# Patient Record
Sex: FEMALE | Race: WHITE | NOT HISPANIC OR LATINO
[De-identification: names, ages, dates, MRNs, and addresses within clinical notes are randomized per-mention and may not be internally consistent; named-entity substitution may affect disease eponyms.]

---

## 2017-02-03 ENCOUNTER — APPOINTMENT (OUTPATIENT)
Dept: FAMILY MEDICINE | Facility: CLINIC | Age: 82
End: 2017-02-03

## 2017-02-03 VITALS
WEIGHT: 90 LBS | SYSTOLIC BLOOD PRESSURE: 159 MMHG | BODY MASS INDEX: 18.14 KG/M2 | HEIGHT: 59 IN | DIASTOLIC BLOOD PRESSURE: 98 MMHG

## 2017-02-14 LAB
ALBUMIN SERPL ELPH-MCNC: 4.2 G/DL
ALP BLD-CCNC: 119 U/L
ALT SERPL-CCNC: 15 U/L
ANION GAP SERPL CALC-SCNC: 18 MMOL/L
AST SERPL-CCNC: 21 U/L
BILIRUB SERPL-MCNC: 0.3 MG/DL
BUN SERPL-MCNC: 28 MG/DL
CALCIUM SERPL-MCNC: 11.2 MG/DL
CHLORIDE SERPL-SCNC: 102 MMOL/L
CO2 SERPL-SCNC: 24 MMOL/L
CREAT SERPL-MCNC: 0.96 MG/DL
GLUCOSE SERPL-MCNC: 76 MG/DL
POTASSIUM SERPL-SCNC: 4.6 MMOL/L
PROT SERPL-MCNC: 7.1 G/DL
SODIUM SERPL-SCNC: 144 MMOL/L

## 2017-05-04 ENCOUNTER — APPOINTMENT (OUTPATIENT)
Dept: FAMILY MEDICINE | Facility: CLINIC | Age: 82
End: 2017-05-04

## 2017-05-04 VITALS
HEIGHT: 59 IN | HEART RATE: 64 BPM | WEIGHT: 90 LBS | DIASTOLIC BLOOD PRESSURE: 84 MMHG | BODY MASS INDEX: 18.14 KG/M2 | SYSTOLIC BLOOD PRESSURE: 133 MMHG

## 2017-05-04 DIAGNOSIS — S72.142D DISPLACED INTERTROCHANTERIC FRACTURE OF LEFT FEMUR, SUBSEQUENT ENCOUNTER FOR CLOSED FRACTURE WITH ROUTINE HEALING: ICD-10-CM

## 2017-05-04 DIAGNOSIS — S00.93XA CONTUSION OF UNSPECIFIED PART OF HEAD, INITIAL ENCOUNTER: ICD-10-CM

## 2017-05-04 DIAGNOSIS — S61.419A LACERATION W/OUT FOREIGN BODY OF UNSPECIFIED HAND, INITIAL ENCOUNTER: ICD-10-CM

## 2017-05-04 DIAGNOSIS — S81.819A LACERATION W/OUT FOREIGN BODY, UNSPECIFIED LOWER LEG, INITIAL ENCOUNTER: ICD-10-CM

## 2017-05-04 DIAGNOSIS — Z20.828 CONTACT WITH AND (SUSPECTED) EXPOSURE TO OTHER VIRAL COMMUNICABLE DISEASES: ICD-10-CM

## 2017-05-04 DIAGNOSIS — L03.90 CELLULITIS, UNSPECIFIED: ICD-10-CM

## 2017-05-04 DIAGNOSIS — Z87.09 PERSONAL HISTORY OF OTHER DISEASES OF THE RESPIRATORY SYSTEM: ICD-10-CM

## 2017-05-04 DIAGNOSIS — J06.9 ACUTE UPPER RESPIRATORY INFECTION, UNSPECIFIED: ICD-10-CM

## 2017-05-04 DIAGNOSIS — S50.311A ABRASION OF RIGHT ELBOW, INITIAL ENCOUNTER: ICD-10-CM

## 2017-05-04 DIAGNOSIS — Z12.39 ENCOUNTER FOR OTHER SCREENING FOR MALIGNANT NEOPLASM OF BREAST: ICD-10-CM

## 2017-05-04 DIAGNOSIS — E78.5 HYPERLIPIDEMIA, UNSPECIFIED: ICD-10-CM

## 2017-05-04 DIAGNOSIS — T17.208A UNSPECIFIED FOREIGN BODY IN PHARYNX CAUSING OTHER INJURY, INITIAL ENCOUNTER: ICD-10-CM

## 2017-05-04 DIAGNOSIS — R39.9 UNSPECIFIED SYMPTOMS AND SIGNS INVOLVING THE GENITOURINARY SYSTEM: ICD-10-CM

## 2017-05-04 DIAGNOSIS — Z23 ENCOUNTER FOR IMMUNIZATION: ICD-10-CM

## 2017-05-04 DIAGNOSIS — R41.3 OTHER AMNESIA: ICD-10-CM

## 2017-05-05 LAB
ALBUMIN SERPL ELPH-MCNC: 3.7 G/DL
ALP BLD-CCNC: 109 U/L
ALT SERPL-CCNC: 17 U/L
ANION GAP SERPL CALC-SCNC: 22 MMOL/L
AST SERPL-CCNC: 38 U/L
BILIRUB SERPL-MCNC: 0.3 MG/DL
BUN SERPL-MCNC: 17 MG/DL
CALCIUM SERPL-MCNC: 10.4 MG/DL
CHLORIDE SERPL-SCNC: 103 MMOL/L
CO2 SERPL-SCNC: 18 MMOL/L
CREAT SERPL-MCNC: 0.96 MG/DL
GLUCOSE SERPL-MCNC: 68 MG/DL
POTASSIUM SERPL-SCNC: 5.9 MMOL/L
PROT SERPL-MCNC: 6.8 G/DL
SODIUM SERPL-SCNC: 143 MMOL/L

## 2017-05-27 LAB
CHOLEST SERPL-MCNC: 190 MG/DL
CHOLEST/HDLC SERPL: 3.7 RATIO
HDLC SERPL-MCNC: 51 MG/DL
LDLC SERPL CALC-MCNC: 109 MG/DL
TRIGL SERPL-MCNC: 152 MG/DL

## 2017-06-23 ENCOUNTER — EMERGENCY (EMERGENCY)
Facility: HOSPITAL | Age: 82
LOS: 1 days | Discharge: HOME | End: 2017-06-23
Attending: EMERGENCY MEDICINE
Payer: MEDICARE

## 2017-06-23 VITALS — WEIGHT: 89.95 LBS | HEIGHT: 59 IN

## 2017-06-23 PROCEDURE — 99283 EMERGENCY DEPT VISIT LOW MDM: CPT

## 2017-06-23 RX ORDER — MUPIROCIN 20 MG/G
1 OINTMENT TOPICAL
Qty: 1 | Refills: 0 | OUTPATIENT
Start: 2017-06-23 | End: 2017-06-28

## 2017-06-23 NOTE — ED STATDOCS - SKIN, MLM
Arthritic changes to bilateral hands. Gold ring tourniquets to fourth digit with soft tissue swelling. Arthritic changes to bilateral hands (swelling of PIP joints and flexor posturing of al fingers involving both hands . Gold ring tourniquets to fourth digit with significant soft tissue swelling, capillary refill to tip of finger intact.

## 2017-06-23 NOTE — ED STATDOCS - PLAN OF CARE
ice and elevate hand as much as possible. tylenol for discomfort.  bactroban to base of finger three time a day

## 2017-06-23 NOTE — ED STATDOCS - CARE PLAN
Principal Discharge DX:	Finger swelling  Instructions for follow-up, activity and diet:	ice and elevate hand as much as possible. tylenol for discomfort.  bactroban to base of finger three time a day  Secondary Diagnosis:	Constriction ring of digit

## 2017-06-23 NOTE — ED STATDOCS - OBJECTIVE STATEMENT
89 yo F presents with daughter complaining of ring stuck on left 4th digit. Daughter states pt ambulates with walker and typically complains of hand soreness. Today, pt complained to daughter of left 4th digit pain. Daughter then noted two rings stuck on pt's 4th left finger. No further complaints. 89 yo F presents with daughter complaining of two rings stuck on left 4th digit. Daughter states pt ambulates with walker and typically complains of hand soreness but complained to daughter of left 4th digit pain today. Daughter then noted two rings stuck on pt's 4th left finger. No further complaints. 89 yo F presents with daughter complaining of two rings stuck on left 4th digit. 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  Noted to have marked swelling of 4th digit today and complained about finger pain.  Daughter unable to remove rings at home.  No further complaints.

## 2017-08-03 ENCOUNTER — APPOINTMENT (OUTPATIENT)
Dept: FAMILY MEDICINE | Facility: CLINIC | Age: 82
End: 2017-08-03
Payer: MEDICARE

## 2017-08-03 VITALS
HEART RATE: 76 BPM | WEIGHT: 90 LBS | HEIGHT: 59 IN | BODY MASS INDEX: 18.14 KG/M2 | DIASTOLIC BLOOD PRESSURE: 80 MMHG | SYSTOLIC BLOOD PRESSURE: 130 MMHG

## 2017-08-03 DIAGNOSIS — I10 ESSENTIAL (PRIMARY) HYPERTENSION: ICD-10-CM

## 2017-08-03 DIAGNOSIS — E87.5 HYPERKALEMIA: ICD-10-CM

## 2017-08-03 PROCEDURE — 36415 COLL VENOUS BLD VENIPUNCTURE: CPT

## 2017-08-03 PROCEDURE — 99214 OFFICE O/P EST MOD 30 MIN: CPT | Mod: 25

## 2017-08-29 LAB
ANION GAP SERPL CALC-SCNC: 15 MMOL/L
BUN SERPL-MCNC: 23 MG/DL
CALCIUM SERPL-MCNC: 10.4 MG/DL
CHLORIDE SERPL-SCNC: 103 MMOL/L
CO2 SERPL-SCNC: 24 MMOL/L
CREAT SERPL-MCNC: 0.76 MG/DL
GLUCOSE SERPL-MCNC: 84 MG/DL
POTASSIUM SERPL-SCNC: 4.1 MMOL/L
SODIUM SERPL-SCNC: 142 MMOL/L

## 2017-09-28 ENCOUNTER — APPOINTMENT (OUTPATIENT)
Dept: FAMILY MEDICINE | Facility: CLINIC | Age: 82
End: 2017-09-28
Payer: MEDICARE

## 2017-09-28 VITALS
DIASTOLIC BLOOD PRESSURE: 70 MMHG | RESPIRATION RATE: 12 BRPM | HEIGHT: 59 IN | WEIGHT: 90 LBS | SYSTOLIC BLOOD PRESSURE: 112 MMHG | BODY MASS INDEX: 18.14 KG/M2 | HEART RATE: 70 BPM

## 2017-09-28 DIAGNOSIS — Z63.4 DISAPPEARANCE AND DEATH OF FAMILY MEMBER: ICD-10-CM

## 2017-09-28 DIAGNOSIS — J18.9 PNEUMONIA, UNSPECIFIED ORGANISM: ICD-10-CM

## 2017-09-28 DIAGNOSIS — J69.0 PNEUMONITIS DUE TO INHALATION OF FOOD AND VOMIT: ICD-10-CM

## 2017-09-28 DIAGNOSIS — R00.1 BRADYCARDIA, UNSPECIFIED: ICD-10-CM

## 2017-09-28 DIAGNOSIS — E87.6 HYPOKALEMIA: ICD-10-CM

## 2017-09-28 DIAGNOSIS — N39.0 URINARY TRACT INFECTION, SITE NOT SPECIFIED: ICD-10-CM

## 2017-09-28 DIAGNOSIS — F03.90 UNSPECIFIED DEMENTIA W/OUT BEHAVIORAL DISTURBANCE: ICD-10-CM

## 2017-09-28 DIAGNOSIS — R58 HEMORRHAGE, NOT ELSEWHERE CLASSIFIED: ICD-10-CM

## 2017-09-28 DIAGNOSIS — Z56.0 UNEMPLOYMENT, UNSPECIFIED: ICD-10-CM

## 2017-09-28 PROCEDURE — 99215 OFFICE O/P EST HI 40 MIN: CPT

## 2017-09-28 RX ORDER — ALPRAZOLAM 0.25 MG/1
0.25 TABLET ORAL
Qty: 10 | Refills: 0 | Status: DISCONTINUED | COMMUNITY
Start: 2017-08-03 | End: 2017-09-28

## 2017-09-28 RX ORDER — AMOXICILLIN AND CLAVULANATE POTASSIUM 500; 125 MG/1; MG/1
500-125 TABLET, FILM COATED ORAL
Refills: 0 | Status: DISCONTINUED | COMMUNITY
Start: 2017-09-18 | End: 2017-09-28

## 2017-09-28 RX ORDER — AZITHROMYCIN 250 MG/1
250 TABLET, FILM COATED ORAL
Refills: 0 | Status: DISCONTINUED | COMMUNITY
Start: 2017-09-18 | End: 2017-09-28

## 2017-09-28 SDOH — SOCIAL STABILITY - SOCIAL INSECURITY: DISSAPEARANCE AND DEATH OF FAMILY MEMBER: Z63.4

## 2017-09-28 SDOH — ECONOMIC STABILITY - INCOME SECURITY: UNEMPLOYMENT, UNSPECIFIED: Z56.0

## 2017-09-29 PROBLEM — E87.6 HYPOKALEMIA: Status: ACTIVE | Noted: 2017-09-29

## 2017-10-02 PROBLEM — Z63.4 WIDOWED: Status: ACTIVE | Noted: 2017-10-02

## 2017-10-02 PROBLEM — Z56.0 UNEMPLOYED: Status: ACTIVE | Noted: 2017-10-02

## 2017-11-02 ENCOUNTER — APPOINTMENT (OUTPATIENT)
Dept: FAMILY MEDICINE | Facility: CLINIC | Age: 82
End: 2017-11-02
Payer: MEDICARE

## 2017-11-02 DIAGNOSIS — Z91.89 OTHER SPECIFIED PERSONAL RISK FACTORS, NOT ELSEWHERE CLASSIFIED: ICD-10-CM

## 2017-11-02 DIAGNOSIS — M24.50 CONTRACTURE, UNSPECIFIED JOINT: ICD-10-CM

## 2017-11-02 DIAGNOSIS — D64.9 ANEMIA, UNSPECIFIED: ICD-10-CM

## 2017-11-02 DIAGNOSIS — F05 DELIRIUM DUE TO KNOWN PHYSIOLOGICAL CONDITION: ICD-10-CM

## 2017-11-02 PROCEDURE — G0008: CPT

## 2017-11-02 PROCEDURE — 36415 COLL VENOUS BLD VENIPUNCTURE: CPT

## 2017-11-02 PROCEDURE — 90662 IIV NO PRSV INCREASED AG IM: CPT

## 2017-11-02 PROCEDURE — 99214 OFFICE O/P EST MOD 30 MIN: CPT | Mod: 25

## 2017-11-03 PROBLEM — F05 SUNDOWNING: Status: ACTIVE | Noted: 2017-08-03

## 2017-11-03 LAB
ALBUMIN SERPL ELPH-MCNC: 3.8 G/DL
ALP BLD-CCNC: 107 U/L
ALT SERPL-CCNC: 33 U/L
ANION GAP SERPL CALC-SCNC: 15 MMOL/L
AST SERPL-CCNC: 25 U/L
BASOPHILS # BLD AUTO: 0.02 K/UL
BASOPHILS NFR BLD AUTO: 0.3 %
BILIRUB SERPL-MCNC: 0.5 MG/DL
BUN SERPL-MCNC: 19 MG/DL
CALCIUM SERPL-MCNC: 10.4 MG/DL
CHLORIDE SERPL-SCNC: 101 MMOL/L
CO2 SERPL-SCNC: 27 MMOL/L
CREAT SERPL-MCNC: 0.65 MG/DL
EOSINOPHIL # BLD AUTO: 0.11 K/UL
EOSINOPHIL NFR BLD AUTO: 1.6 %
GLUCOSE SERPL-MCNC: 82 MG/DL
HCT VFR BLD CALC: 47.5 %
HGB BLD-MCNC: 14.7 G/DL
IMM GRANULOCYTES NFR BLD AUTO: 0 %
LYMPHOCYTES # BLD AUTO: 1.71 K/UL
LYMPHOCYTES NFR BLD AUTO: 24.7 %
MAN DIFF?: NORMAL
MCHC RBC-ENTMCNC: 28.9 PG
MCHC RBC-ENTMCNC: 30.9 GM/DL
MCV RBC AUTO: 93.5 FL
MONOCYTES # BLD AUTO: 0.59 K/UL
MONOCYTES NFR BLD AUTO: 8.5 %
NEUTROPHILS # BLD AUTO: 4.49 K/UL
NEUTROPHILS NFR BLD AUTO: 64.9 %
PLATELET # BLD AUTO: 302 K/UL
POTASSIUM SERPL-SCNC: 4.4 MMOL/L
PROT SERPL-MCNC: 7.4 G/DL
RBC # BLD: 5.08 M/UL
RBC # FLD: 15.8 %
SODIUM SERPL-SCNC: 143 MMOL/L
WBC # FLD AUTO: 6.92 K/UL

## 2017-11-26 ENCOUNTER — TRANSCRIPTION ENCOUNTER (OUTPATIENT)
Age: 82
End: 2017-11-26

## 2018-01-30 DIAGNOSIS — F03.90 UNSPECIFIED DEMENTIA W/OUT BEHAVIORAL DISTURBANCE: ICD-10-CM

## 2018-02-02 ENCOUNTER — APPOINTMENT (OUTPATIENT)
Dept: FAMILY MEDICINE | Facility: CLINIC | Age: 83
End: 2018-02-02